# Patient Record
Sex: FEMALE | Race: WHITE | NOT HISPANIC OR LATINO | Employment: FULL TIME | URBAN - METROPOLITAN AREA
[De-identification: names, ages, dates, MRNs, and addresses within clinical notes are randomized per-mention and may not be internally consistent; named-entity substitution may affect disease eponyms.]

---

## 2017-08-14 ENCOUNTER — APPOINTMENT (OUTPATIENT)
Dept: GENERAL RADIOLOGY | Facility: HOSPITAL | Age: 47
End: 2017-08-14

## 2017-08-14 ENCOUNTER — HOSPITAL ENCOUNTER (EMERGENCY)
Facility: HOSPITAL | Age: 47
Discharge: HOME | End: 2017-08-14
Attending: EMERGENCY MEDICINE | Admitting: EMERGENCY MEDICINE

## 2017-08-14 VITALS
TEMPERATURE: 97.9 F | RESPIRATION RATE: 17 BRPM | WEIGHT: 128 LBS | OXYGEN SATURATION: 97 % | HEIGHT: 67 IN | DIASTOLIC BLOOD PRESSURE: 81 MMHG | SYSTOLIC BLOOD PRESSURE: 118 MMHG | HEART RATE: 80 BPM | BODY MASS INDEX: 20.09 KG/M2

## 2017-08-14 DIAGNOSIS — S52.091A OTHER CLOSED FRACTURE OF PROXIMAL END OF RIGHT ULNA, INITIAL ENCOUNTER: Primary | ICD-10-CM

## 2017-08-14 PROCEDURE — 73070 X-RAY EXAM OF ELBOW: CPT

## 2017-08-14 PROCEDURE — 73030 X-RAY EXAM OF SHOULDER: CPT

## 2017-08-14 PROCEDURE — 96372 THER/PROPH/DIAG INJ SC/IM: CPT

## 2017-08-14 PROCEDURE — 99283 EMERGENCY DEPT VISIT LOW MDM: CPT

## 2017-08-14 PROCEDURE — 25010000002 MORPHINE PER 10 MG: Performed by: PHYSICIAN ASSISTANT

## 2017-08-14 PROCEDURE — 25010000002 MORPHINE PER 10 MG: Performed by: EMERGENCY MEDICINE

## 2017-08-14 RX ORDER — AMOXICILLIN AND CLAVULANATE POTASSIUM 875; 125 MG/1; MG/1
1 TABLET, FILM COATED ORAL 2 TIMES DAILY
COMMUNITY

## 2017-08-14 RX ORDER — NAPROXEN SODIUM 220 MG
220 TABLET ORAL 4 TIMES DAILY
COMMUNITY

## 2017-08-14 RX ORDER — HYDROCODONE BITARTRATE AND ACETAMINOPHEN 10; 325 MG/1; MG/1
1 TABLET ORAL EVERY 6 HOURS PRN
COMMUNITY

## 2017-08-14 RX ORDER — ESTRADIOL 0.07 MG/D
1 PATCH TRANSDERMAL WEEKLY
COMMUNITY

## 2017-08-14 RX ORDER — CLONAZEPAM 0.5 MG/1
0.5 TABLET ORAL 2 TIMES DAILY
COMMUNITY

## 2017-08-14 RX ORDER — OXYCODONE HYDROCHLORIDE AND ACETAMINOPHEN 5; 325 MG/1; MG/1
1 TABLET ORAL EVERY 4 HOURS PRN
Qty: 16 TABLET | Refills: 0 | Status: SHIPPED | OUTPATIENT
Start: 2017-08-14

## 2017-08-14 RX ADMIN — MORPHINE SULFATE 4 MG: 4 INJECTION, SOLUTION INTRAMUSCULAR; INTRAVENOUS at 17:40

## 2017-08-14 RX ADMIN — MORPHINE SULFATE 4 MG: 4 INJECTION, SOLUTION INTRAMUSCULAR; INTRAVENOUS at 16:32

## 2017-08-14 NOTE — ED PROVIDER NOTES
EMERGENCY DEPARTMENT ENCOUNTER    CHIEF COMPLAINT  Chief Complaint: R elbow pain   History given by: pt   History limited by: none   Room Number: HALA/A  PMD: No Known Provider      HPI:  Pt is a 46 y.o. female who presents with R elbow pain s/p trip and fall while ice skating earlier today. Pt states that she hit her head in the fall, but denies LOC. Pt also c/o L elbow pain. Pt denies pain to her head neck, or back. Pt states that she takes Aleve and Norco 10s, for a R hip labrum tear, but she has not had Norco today.     Duration: since just PTA   Timing: constant   Location: R elbow   Radiation: none stated   Quality: pain   Intensity/Severity: moderate   Progression: unchanged   Associated Symptoms: L elbow pain   Aggravating Factors: none stated   Alleviating Factors: none stated   Previous Episodes: none   Treatment before arrival: none     MEDICAL RECORD REVIEW  Pt had a fall while ice skating. Hx chronic neck pain.    PAST MEDICAL HISTORY  Active Ambulatory Problems     Diagnosis Date Noted   • No Active Ambulatory Problems     Resolved Ambulatory Problems     Diagnosis Date Noted   • No Resolved Ambulatory Problems     Past Medical History:   Diagnosis Date   • Anxiety    • Asthma    • Cervical neck pain with evidence of disc disease        PAST SURGICAL HISTORY  Past Surgical History:   Procedure Laterality Date   • KNEE CARTILAGE SURGERY      LEFT KNEE       FAMILY HISTORY  History reviewed. No pertinent family history.    SOCIAL HISTORY  Social History     Social History   • Marital status:      Spouse name: N/A   • Number of children: N/A   • Years of education: N/A     Occupational History   • Not on file.     Social History Main Topics   • Smoking status: Never Smoker   • Smokeless tobacco: Not on file   • Alcohol use Yes      Comment: OCCASIONAL   • Drug use: No   • Sexual activity: Not on file     Other Topics Concern   • Not on file     Social History Narrative   • No narrative on file        ALLERGIES  Review of patient's allergies indicates no known allergies.    REVIEW OF SYSTEMS  Review of Systems   Constitutional: Negative for fever.   HENT: Negative for sore throat.    Eyes: Negative.    Respiratory: Negative for cough and shortness of breath.    Cardiovascular: Negative for chest pain.   Gastrointestinal: Negative for abdominal pain, diarrhea and vomiting.   Genitourinary: Negative for dysuria.   Musculoskeletal: Negative for neck pain.        Bilateral elbow pain, R>L.   Skin: Negative for rash.   Allergic/Immunologic: Negative.    Neurological: Negative for weakness, numbness and headaches.   Hematological: Negative.    Psychiatric/Behavioral: Negative.    All other systems reviewed and are negative.      PHYSICAL EXAM  ED Triage Vitals   Temp Heart Rate Resp BP SpO2   08/14/17 1508 08/14/17 1348 08/14/17 1508 08/14/17 1508 08/14/17 1348   97.9 °F (36.6 °C) 141 16 118/81 97 %      Temp src Heart Rate Source Patient Position BP Location FiO2 (%)   08/14/17 1508 -- -- -- --   Tympanic           Physical Exam   Constitutional: She is oriented to person, place, and time and well-developed, well-nourished, and in no distress. No distress.   HENT:   Head: Normocephalic and atraumatic.   Eyes: EOM are normal.   Neck: Normal range of motion.   Pulmonary/Chest: Effort normal. No respiratory distress.   Musculoskeletal: She exhibits tenderness (R elbow).   No tenderness medial or lateral L epicondyle. No C-spine tenderness. Joint effusion surrounding R elbow.    Neurological: She is alert and oriented to person, place, and time.   Good distal , neurovascularly intact distally   Skin: Skin is warm and dry. Bruising (contusion with swelling to L elbow) noted. No pallor.   Psychiatric: Mood, memory, affect and judgment normal.   Nursing note and vitals reviewed.      LAB RESULTS  No results found for this or any previous visit (from the past 24 hour(s)).    I ordered the above labs and reviewed  the results    RADIOLOGY  XR Shoulder 2+ View Right   Final Result   FINDINGS:  1. Normal right shoulder   XR Elbow 2 View Right   Final Result   FINDINGS:  1. Fracture with separation at fracture site involving the proximal  ulna.  2. Hemarthrosis.  3. The radius appears intact, the humerus appears intact.       I ordered the above noted radiological studies and reviewed the images on the PACS system.      PROCEDURES  Splint Application  Date/Time: 8/14/2017 5:40 PM  Performed by: SAVANAH CERRATO  Authorized by: SHERRY ECHOLS   Consent: Verbal consent obtained.  Location details: right elbow  Splint type: long arm  Supplies used: Ortho-Glass  Post-procedure: The splinted body part was neurovascularly unchanged following the procedure.  Patient tolerance: Patient tolerated the procedure well with no immediate complications        COURSE & MEDICAL DECISION MAKING  Pertinent Imaging studies that were ordered and reviewed are noted above.  Results were reviewed/discussed with the patient and they were also made aware of online assess.  Pt also made aware that some labs, such as cultures, will not be resulted during ER visit and follow up with PMD is necessary.       PROGRESS AND CONSULTS    Progress Notes:    1648  Discussed pt's XR findings, including findings of a R olecranon fracture. Plan to d/c the pt with a splint and meds for pain, and a f/u with an orthopedist. Pt states that she would like to f/u with an orthopedist in [place].     1650  Ordered XR R shoulder for further evaluation.     1706  Reviewed pt's history and workup with Dr. Echols.  After a bedside evaluation; Dr Echols agrees with the plan of care.    1733  Ordered morphine for the pt's pain.     1740  Applied a splint to the pt's R arm.     1748  The patient's history, physical exam, and imaging were discussed with the physician, who also performed a face to face history and physical exam.  I discussed all results and noted any abnormalities  "with patient.  Discussed absoute need to recheck abnormalities with their family physician.  I answered any of the patient's questions.  Discussed plan for discharge, as there is no emergent indication for admission.  Pt is agreeable and understands need for follow up and repeat testing.  Pt is aware that discharge does not mean that nothing is wrong but it indicates no emergency is present and they must continue care with their family physician.  Pt is discharged with instructions to follow up with primary care doctor to have their blood pressure rechecked.       MEDICATIONS GIVEN IN ER  Medications   morphine injection 4 mg (not administered)   morphine injection 4 mg (4 mg Intramuscular Given 8/14/17 1632)       /81  Pulse 66  Temp 97.9 °F (36.6 °C) (Tympanic)   Resp 16  Ht 67\" (170.2 cm)  Wt 128 lb (58.1 kg)  SpO2 97%  BMI 20.05 kg/m2      DIAGNOSIS  Final diagnoses:   Other closed fracture of proximal end of right ulna, initial encounter       FOLLOW UP   Mauro Guillen Jr., MD  [address] [facility] [address]  [place] [address]  [phone]            RX     Medication List      New Prescriptions          oxyCODONE-acetaminophen 5-325 MG per tablet   Commonly known as:  PERCOCET   Take 1 tablet by mouth Every 4 (Four) Hours As Needed for Moderate Pain   (4-6).         Stop          HYDROcodone-acetaminophen  MG per tablet   Commonly known as:  NORCO           [facility] report [id number] reviewed.  Risks, benefits, alternatives discussed with patient.  Pt consents to treatment and agrees to follow up with PMD tomorrow for further care and any other prescriptions.       Documentation assistance provided by jeanie Flood for Sonia Mason PA-C.  Information recorded by the jeanie was done at my direction and has been verified and validated by me.     Juaquin Flood  08/14/17 1751       Sonia Mason PA-C  08/14/17 1752       Sonia Mason PA-C  09/30/17 1815    "

## 2017-08-14 NOTE — DISCHARGE INSTRUCTIONS
PLEASE READ AND REVIEW ALL DISCHARGE INSTRUCTIONS.     Please follow up with your primary care physician for any further evaluation/treatment and further management of your blood pressure.     Recheck in emergency department for any worsening and/or concerning symptoms.    Take all prescribed medicine as written and continue chronic medication.    Please discontinue your Norco while taking Percocet.  DO NOT DRIVE WHILE TAKING PAIN MEDICINE.    Keep splint in place until you follow up with orthopedics.  You may follow up with Dr. Guillen in [place], [place] or an orthopedist in your hometown of [place], [place].  The follow up needs to be in the next week.

## 2017-08-14 NOTE — ED PROVIDER NOTES
Pt presents to the ED complaining of R elbow pain, which began PTA after fall on her R elbow while ice skating. Pt is R hand dominant and states that the pain radiates up to her R shoulder when she moves her R arm. Pt's R elbow XR shows a fracture with separation at fracture site involving the proximal ulna and hemarthrosis. On exam, the pt is A+Ox3, in NAD but appears to be in pain. The pt has minimal R shoulder tenderness with no deformity, marked R elbow tenderness and swelling with deformity and contusion. Pt is NVI in her right hand. There is a contusion to to her left elbow as well. The pt is normocephalic, atraumatic and has no C-Spine tenderness. I agree with the plan to order a R shoulder XR prior to having DANAY Mason splint the pt's R arm and discharging the pt home to follow up with her orthopedist in [place].    I supervised care provided by the midlevel provider.    We have discussed this patient's history, physical exam, and treatment plan.   I have reviewed the note and personally saw and examined the patient and agree with the plan of care.    Documentation assistance provided by jeanie David for Dr. Carmona.  Information recorded by the jeanie was done at my direction and has been verified and validated by me.     Sandra David  08/14/17 9736       Phillip Carmona MD  08/14/17 6464